# Patient Record
Sex: FEMALE | Race: BLACK OR AFRICAN AMERICAN | NOT HISPANIC OR LATINO | URBAN - METROPOLITAN AREA
[De-identification: names, ages, dates, MRNs, and addresses within clinical notes are randomized per-mention and may not be internally consistent; named-entity substitution may affect disease eponyms.]

---

## 2023-03-29 ENCOUNTER — OFFICE VISIT (OUTPATIENT)
Dept: URBAN - METROPOLITAN AREA CLINIC 94 | Facility: CLINIC | Age: 53
End: 2023-03-29
Payer: COMMERCIAL

## 2023-03-29 ENCOUNTER — DASHBOARD ENCOUNTERS (OUTPATIENT)
Age: 53
End: 2023-03-29

## 2023-03-29 ENCOUNTER — LAB OUTSIDE AN ENCOUNTER (OUTPATIENT)
Dept: URBAN - METROPOLITAN AREA CLINIC 94 | Facility: CLINIC | Age: 53
End: 2023-03-29

## 2023-03-29 VITALS
WEIGHT: 174 LBS | TEMPERATURE: 97.3 F | SYSTOLIC BLOOD PRESSURE: 118 MMHG | DIASTOLIC BLOOD PRESSURE: 71 MMHG | BODY MASS INDEX: 32.85 KG/M2 | HEART RATE: 61 BPM | HEIGHT: 61 IN

## 2023-03-29 DIAGNOSIS — R19.4 CHANGE IN BOWEL HABITS: ICD-10-CM

## 2023-03-29 PROCEDURE — 99244 OFF/OP CNSLTJ NEW/EST MOD 40: CPT | Performed by: INTERNAL MEDICINE

## 2023-03-29 PROCEDURE — 99204 OFFICE O/P NEW MOD 45 MIN: CPT | Performed by: INTERNAL MEDICINE

## 2023-03-29 RX ORDER — OXYCODONE HYDROCHLORIDE 5 MG/1
1 TABLET AS NEEDED TABLET ORAL
Status: ACTIVE | COMMUNITY

## 2023-03-29 RX ORDER — ONDANSETRON 4 MG/1
1 TABLET ON THE TONGUE AND ALLOW TO DISSOLVE TABLET, ORALLY DISINTEGRATING ORAL ONCE A DAY
Status: ACTIVE | COMMUNITY

## 2023-03-29 RX ORDER — VALACYCLOVIR 500 MG/1
1 TABLET TABLET, FILM COATED ORAL ONCE A DAY
Status: ACTIVE | COMMUNITY

## 2023-03-29 RX ORDER — ASPIRIN 81 MG/1
1 TABLET TABLET, COATED ORAL TWICE PER DAY
Status: ACTIVE | COMMUNITY
Start: 2023-03-29

## 2023-03-29 RX ORDER — GABAPENTIN 600 MG/1
1 CAPSULE TABLET, FILM COATED ORAL
Status: ACTIVE | COMMUNITY

## 2023-03-29 RX ORDER — BENZONATATE 100 MG/1
TAKE ONE CAPSULE BY MOUTH EVERY 8 HOURS FOR 7 DAYS CAPSULE ORAL
Qty: 21 UNSPECIFIED | Refills: 0 | Status: ACTIVE | COMMUNITY

## 2023-03-29 RX ORDER — POLYETHYLENE GLYCOL 3350, SODIUM SULFATE ANHYDROUS, SODIUM BICARBONATE, SODIUM CHLORIDE, POTASSIUM CHLORIDE 236; 22.74; 6.74; 5.86; 2.97 G/4L; G/4L; G/4L; G/4L; G/4L
4000 ML POWDER, FOR SOLUTION ORAL AS DIRECTED
Qty: 1 | OUTPATIENT
Start: 2023-03-29

## 2023-03-29 RX ORDER — LENALIDOMIDE 10 MG/1
1 CAPSULE CAPSULE ORAL ONCE A DAY
Status: ACTIVE | COMMUNITY

## 2023-03-29 NOTE — HPI-TODAY'S VISIT:
This 52 yr patient was referred by Dr. Brody  for an evaluation of change in bowel habits.  A copy of this will be sent to the referring provider. Pt has multiple myeloma undergoing chemotherapy (Revlimid) by Dr Bordy. Pt has history of chronic constipation BM's have changed over last 6 months. Pt has developed constipation alternating with diarrhea. She would not move bowels for 5-6 days, followed by bout soft loose stools with abdominal cramping. Denies rectal bleeding or weight loss. Tried MiraLAX which caused cramping and stopped it. Pt never had a colonoscopy Denies family history of colorectal neoplasm.  CBC 3/18/23: WNL. Pt previously evaluated by Dr Romero from GI in 6/2022 for nausea and vomiting. Pt was treated with Pylera for H Pylori gastritis and Diflucan for candida esophagitis at that time.  CT A/P 6/2022: No specifically acute intra-abdominal process. No abnormality to account for emesis. There is fluid in the colon. Request correlation for evidence of diarrhea state.Findings in the bony elements consistent with multiple myeloma. There is a compression at L2. This compression does not appear to be acute.

## 2023-04-27 ENCOUNTER — OFFICE VISIT (OUTPATIENT)
Dept: URBAN - METROPOLITAN AREA MEDICAL CENTER 34 | Facility: MEDICAL CENTER | Age: 53
End: 2023-04-27

## 2023-06-07 ENCOUNTER — OFFICE VISIT (OUTPATIENT)
Dept: URBAN - METROPOLITAN AREA MEDICAL CENTER 34 | Facility: MEDICAL CENTER | Age: 53
End: 2023-06-07
Payer: COMMERCIAL

## 2023-06-07 DIAGNOSIS — R19.4 ALTERATION IN BOWEL ELIMINATION: ICD-10-CM

## 2023-06-07 DIAGNOSIS — D12.3 ADENOMA OF TRANSVERSE COLON: ICD-10-CM

## 2023-06-07 PROCEDURE — 45385 COLONOSCOPY W/LESION REMOVAL: CPT | Performed by: INTERNAL MEDICINE
